# Patient Record
(demographics unavailable — no encounter records)

---

## 2025-02-04 NOTE — END OF VISIT
[] : Resident [FreeTextEntry3] : I personally saw and examined this patient with the resident physician and was present for the key portions of history taking, examination as well as any procedures performed .  I agree with the assessment and plan as documented in the resident's note unless noted below.   Asif Juarez MD Physician, Dermatology and Dermatologic Surgery [facility]

## 2025-02-04 NOTE — PHYSICAL EXAM
[Alert] : alert [Oriented x 3] : ~L oriented x 3 [Conjunctiva Non-injected] : conjunctiva non-injected [Well Nourished] : well nourished [No Visual Lymphadenopathy] : no visual  lymphadenopathy [No Clubbing] : no clubbing [No Edema] : no edema [No Bromhidrosis] : no bromhidrosis [No Chromhidrosis] : no chromhidrosis [Hair] : Hair [Scalp] : Scalp [Face] : Face [Nose] : Nose [Eyelids] : Eyelids [Ears] : Ears [Lips] : Lips [Neck] : Neck [Nodes] : Nodes [FreeTextEntry3] : healing 0.9 cm x 0.8 cm crusted biopsy scar at Right superior antihelix  neg head/neck lymphadenopathy

## 2025-02-04 NOTE — END OF VISIT
[] : Resident [FreeTextEntry3] : I personally saw and examined this patient with the resident physician and was present for the key portions of history taking, examination as well as any procedures performed .  I agree with the assessment and plan as documented in the resident's note unless noted below.   Asif Juarez MD Physician, Dermatology and Dermatologic Surgery [facility] Rhombic Flap Text: The defect edges were debeveled with a #15 scalpel blade.  Given the location of the defect and the proximity to free margins a rhombic flap was deemed most appropriate.  Using a sterile surgical marker, an appropriate rhombic flap was drawn incorporating the defect.    The area thus outlined was incised deep to adipose tissue with a #15 scalpel blade.  The skin margins were undermined to an appropriate distance in all directions utilizing iris scissors.

## 2025-02-04 NOTE — ASSESSMENT
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  I recommended Mohs surgery due to the tumor type, location, and ill-defined nature of cancer.   Mohs Appropriate Use Criteria (AUC) Score: 8  I explained that following extirpation there will be a full thickness defect of the involved area. The reconstructive options will be based on the defect size and surrounding tissue laxity of the involved area. Primary closure is only possible for smaller defects. For larger defects, local tissue rearrangement or skin grafting may be necessary. Risks following layered primary closure or local tissue rearrangement include wound dehiscence, contour irregularity, bleeding, infection, and paresthesia (nerve damage including sensory deficit or motor weakness). Risks following skin grafting include wound dehiscence, skin graft nonadherence (partial or complete), contour irregularity, bleeding, infection, paresthesia, and donor site complications. I explained that the patient will need to abstain from physical activity for 1-2 weeks following the surgery, that they would heal with a scar, and also discussed the chances of infection, bleeding, potential sensory or motor nerve damage, and recurrence.  The patient indicated that s/he understood the risks and wished to schedule the procedure. -- In particular, for reconstruction we discussed second intent healing  Thank you for this Mohs surgery referral. We recommended that Mr. LUCINDA LEON follow up with His referring dermatologist for routine skin exams following surgery.   Asif Juarez MD Physician, Dermatology & Dermatologic Surgery [facility]

## 2025-02-04 NOTE — HISTORY OF PRESENT ILLNESS
[FreeTextEntry1] : Mohs surgery consultation SCC Right superior antihelix  [de-identified] : 02/03/2025  Referred by: Dr. Rangel  Mr. LUCINDA LEON is a 68 year old M w/ history of SCC, R frontal scalp s/p Mohs in 2022 who presents for Mohs surgery consultation SCC R superior antihelix. Patient states he noticed the lesion a few months ago, never painful, never bled. He was seen by Dr Rangel, received LN2 to the lesion, after which lesion began to grow further. Biopsy obtained by Dr Rangel at followup      Pertinent positives noted below History of HIV or hepatitis: No Blood thinners: No Antibiotic Prophylaxis: None Medical implants: None   The patient's review of systems questionnaire was reviewed. Education needs were identified. There were no barriers to learning.

## 2025-02-04 NOTE — ASSESSMENT
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  I recommended Mohs surgery due to the tumor type, location, and ill-defined nature of cancer.   Mohs Appropriate Use Criteria (AUC) Score: 8  I explained that following extirpation there will be a full thickness defect of the involved area. The reconstructive options will be based on the defect size and surrounding tissue laxity of the involved area. Primary closure is only possible for smaller defects. For larger defects, local tissue rearrangement or skin grafting may be necessary. Risks following layered primary closure or local tissue rearrangement include wound dehiscence, contour irregularity, bleeding, infection, and paresthesia (nerve damage including sensory deficit or motor weakness). Risks following skin grafting include wound dehiscence, skin graft nonadherence (partial or complete), contour irregularity, bleeding, infection, paresthesia, and donor site complications. I explained that the patient will need to abstain from physical activity for 1-2 weeks following the surgery, that they would heal with a scar, and also discussed the chances of infection, bleeding, potential sensory or motor nerve damage, and recurrence.  The patient indicated that s/he understood the risks and wished to schedule the procedure. -- In particular, for reconstruction we discussed second intent healing  Thank you for this Mohs surgery referral. We recommended that Mr. LUCINDA LEON follow up with His referring dermatologist for routine skin exams following surgery.   Asif Juarez MD Physician, Dermatology & Dermatologic Surgery [facility]

## 2025-02-04 NOTE — HISTORY OF PRESENT ILLNESS
[FreeTextEntry1] : Mohs surgery consultation SCC Right superior antihelix  [de-identified] : 02/03/2025  Referred by: Dr. Rangel  Mr. LUCINDA LEON is a 68 year old M w/ history of SCC, R frontal scalp s/p Mohs in 2022 who presents for Mohs surgery consultation SCC R superior antihelix. Patient states he noticed the lesion a few months ago, never painful, never bled. He was seen by Dr Rangel, received LN2 to the lesion, after which lesion began to grow further. Biopsy obtained by Dr Rangel at followup      Pertinent positives noted below History of HIV or hepatitis: No Blood thinners: No Antibiotic Prophylaxis: None Medical implants: None   The patient's review of systems questionnaire was reviewed. Education needs were identified. There were no barriers to learning.

## 2025-02-06 NOTE — END OF VISIT
[] : Resident [FreeTextEntry3] : I personally saw and examined this patient with the fellow physician and was present for the key portions of history taking, examination as well as the Mohs procedures performed .  I agree with the assessment and plan as documented in the fellow's note unless noted below.   Asif Juarez MD Physician, Dermatology and Dermatologic Surgery [facility]

## 2025-02-06 NOTE — HISTORY OF PRESENT ILLNESS
[FreeTextEntry1] : Mohs surgery for SCC Right superior antihelix  [de-identified] : 02/05/2025  Referred by: Dr. Rangel  Mr. LUCINDA LEON is a 68 year old M w/ history of SCC, R frontal scalp s/p Mohs in 2022 who presents for Mohs surgery consultation SCC R superior antihelix. Patient states he noticed the lesion a few months ago, never painful, never bled. He was seen by Dr Rangel, received LN2 to the lesion, after which lesion began to grow further. Biopsy obtained by Dr Rangel at followup showing SCC at least in situ extending to the base and cannot r/o invasive SCC. No treatment to date.     Pertinent positives noted below History of HIV or hepatitis: No Blood thinners: No Antibiotic Prophylaxis: None Medical implants: None   The patient's review of systems questionnaire was reviewed. Education needs were identified. There were no barriers to learning.  Mohs surgery consultation for SCC Right Superior Antihelix  -- I explained the treatment options of topical immunomodulatory or chemotherapy, electrodessication and curettage, radiation therapy, excision and Mohs surgery. I recommended Mohs surgery due to the tumor type, location, and ill-defined nature of cancer.  Mohs Appropriate Use Criteria (AUC) Score: 8  I explained that following extirpation there will be a full thickness defect of the involved area. The reconstructive options will be based on the defect size and surrounding tissue laxity of the involved area. Primary closure is only possible for smaller defects. For larger defects, local tissue rearrangement or skin grafting may be necessary. Risks following layered primary closure or local tissue rearrangement include wound dehiscence, contour irregularity, bleeding, infection, and paresthesia (nerve damage including sensory deficit or motor weakness). Risks following skin grafting include wound dehiscence, skin graft nonadherence (partial or complete), contour irregularity, bleeding, infection, paresthesia, and donor site complications. I explained that the patient will need to abstain from physical activity for 1-2 weeks following the surgery, that they would heal with a scar, and also discussed the chances of infection, bleeding, potential sensory or motor nerve damage, and recurrence. The patient indicated that s/he understood the risks and wished to proceed today -- In particular, for reconstruction we discussed second intent healing  Thank you for this Mohs surgery referral. We recommended that Mr. LUCINDA LEON follow up with His referring dermatologist for routine skin exams following surgery.

## 2025-02-06 NOTE — ASSESSMENT
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   Thank you for this Mohs surgery referral.  Asif Juarez MD Physician, Dermatology & Dermatologic Surgery [facility].

## 2025-02-06 NOTE — PHYSICAL EXAM
[Alert] : alert [Oriented x 3] : ~L oriented x 3 [Well Nourished] : well nourished [Conjunctiva Non-injected] : conjunctiva non-injected [No Visual Lymphadenopathy] : no visual  lymphadenopathy [No Clubbing] : no clubbing [No Edema] : no edema [No Bromhidrosis] : no bromhidrosis [No Chromhidrosis] : no chromhidrosis [Hair] : Hair [Scalp] : Scalp [Face] : Face [Nose] : Nose [Eyelids] : Eyelids [Ears] : Ears [Lips] : Lips [Neck] : Neck [Nodes] : Nodes [FreeTextEntry3] : 1.1 cm x 1.0 cm crusted biopsy scar at Right superior antihelix  neg head/neck lymphadenopathy

## 2025-02-06 NOTE — HISTORY OF PRESENT ILLNESS
[FreeTextEntry1] : Mohs surgery for SCC Right superior antihelix  [de-identified] : 02/05/2025  Referred by: Dr. Rangel  Mr. LUCINDA LEON is a 68 year old M w/ history of SCC, R frontal scalp s/p Mohs in 2022 who presents for Mohs surgery consultation SCC R superior antihelix. Patient states he noticed the lesion a few months ago, never painful, never bled. He was seen by Dr Rangel, received LN2 to the lesion, after which lesion began to grow further. Biopsy obtained by Dr Rangel at followup showing SCC at least in situ extending to the base and cannot r/o invasive SCC. No treatment to date.     Pertinent positives noted below History of HIV or hepatitis: No Blood thinners: No Antibiotic Prophylaxis: None Medical implants: None   The patient's review of systems questionnaire was reviewed. Education needs were identified. There were no barriers to learning.  Mohs surgery consultation for SCC Right Superior Antihelix  -- I explained the treatment options of topical immunomodulatory or chemotherapy, electrodessication and curettage, radiation therapy, excision and Mohs surgery. I recommended Mohs surgery due to the tumor type, location, and ill-defined nature of cancer.  Mohs Appropriate Use Criteria (AUC) Score: 8  I explained that following extirpation there will be a full thickness defect of the involved area. The reconstructive options will be based on the defect size and surrounding tissue laxity of the involved area. Primary closure is only possible for smaller defects. For larger defects, local tissue rearrangement or skin grafting may be necessary. Risks following layered primary closure or local tissue rearrangement include wound dehiscence, contour irregularity, bleeding, infection, and paresthesia (nerve damage including sensory deficit or motor weakness). Risks following skin grafting include wound dehiscence, skin graft nonadherence (partial or complete), contour irregularity, bleeding, infection, paresthesia, and donor site complications. I explained that the patient will need to abstain from physical activity for 1-2 weeks following the surgery, that they would heal with a scar, and also discussed the chances of infection, bleeding, potential sensory or motor nerve damage, and recurrence. The patient indicated that s/he understood the risks and wished to proceed today -- In particular, for reconstruction we discussed second intent healing  Thank you for this Mohs surgery referral. We recommended that Mr. LUCINDA LEON follow up with His referring dermatologist for routine skin exams following surgery.

## 2025-02-06 NOTE — PROCEDURE
[TextEntry] : Mohs Surgery Procedure Note   A surgical time out was taken to confirm the patient's correct identity and the correct site. The operative site was identified by the patient and surgical team prior to surgery and the patient agreed to proceed with the surgical site which was marked prior to anesthesia infiltration.   Mohs Micrographic Surgery Report Date Collected: 02/05/2025 Date Received: Same as above Date Verified: Same as above Attending Surgeon: Asif Juarez MD Fellow: Marilee Wolf MD Assistants: Marty Montemayor RN, Amelia Louise MA Procedure#:  Diagnosis: SCC Location: Right Superior Antihelix Pre-op Size: 1.1 cm x 1.0 cm Post-Operative Final Defect Size: 1.5 cm x 1.1 cm Stages (number of pieces per stage): 1 (2/1) Pathology, if tumor noted in stages: Stage I with Sparse perivascular lymphocytic infiltrate without evidence of residual carcinoma on sections examined Indications for procedure: Ill-defined tumor margins, high-priority anatomic location for preservation of normal tissue, aggressive histologic nature of the tumor Repair type: N/A (Second intent) Total volume of anesthesia: 5 mL    Mohs Procedure Report:   The patient was escorted to the outpatient surgical operatory. The proposed Mohs procedure and reconstruction options were discussed with the patient. The risks, benefits, and alternatives were discussed and the patient signed a written consent form. A time out was taken to confirm the patient's identity and the exact location of the skin cancer. After the patient was placed in a recumbent position, the surgical site was cleaned with alcohol and either Betadine (for eyes and ears) or chlorhexidine gluconate, draped, and infiltrated with 0.5% lidocaine with 1:200,000 epinephrine local anesthetic. A sterile surgical marking pen was used to outline a thin margin of normal-appearing skin around the tumor. A beveled incision was then made using a scalpel. Small orienting nicks were made on the specimen and the surrounding skin. The tissue was then sharply dissected from the surrounding skin. Hemostasis was maintained with the electrosurgical unit and/or pressure. A temporary dressing was placed on the surgical defect until the frozen section slides were interpreted. The oriented specimen was placed in a Rod dish and transported to the [facility] laboratory. For each stage of the procedure, a visual representation of the specimen was drawn on a Mohs map. This map graphically depicts the specimen's two-dimensional appearance, orientation, and tissue preparation, which consists of dividing the specimen and applying tissue dyes. Because the deep and peripheral portions of the tissue are then embedded in the same geometric plane, the map also represents an oriented scale drawing of the resulting histologic sections. The Mohs technician then prepared frozen section slides using standard techniques. The slides were stained with hematoxylin and eosin, and cover slips were applied. The frozen section slides were then examined under the microscope. If tumor was found, it was localized on the map. The orienting nicks on the original specimen corresponded to similar nicks on the surgical defect so areas of identified tumor could be mapped back to the patient and resected. Additional layers of removed skin were then processed as indicated above. This iterative process continued as applicable until no tumor was observed microscopically. At this stage, the Mohs resection was complete.  Second Intention Healing After the surgical procedure was completed, the patient was brought back to the minor surgery operatory. Various reconstructive modalities were discussed with the patient. Second intention healing was selected as the best option. A pressure dressing composed of Vaseline ointment, Telfa, and sterile gauze and was securely taped into place. The patient was given complete verbal and written wound care instructions and was asked to follow up for a wound check as indicated. The patient ambulated from the minor surgery operatory without problems.

## 2025-02-06 NOTE — ASSESSMENT
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   Thank you for this Mohs surgery referral.  Asif Juarez MD Physician, Dermatology & Dermatologic Surgery [facility].

## 2025-02-06 NOTE — PROCEDURE
[TextEntry] : Mohs Surgery Procedure Note   A surgical time out was taken to confirm the patient's correct identity and the correct site. The operative site was identified by the patient and surgical team prior to surgery and the patient agreed to proceed with the surgical site which was marked prior to anesthesia infiltration.   Mohs Micrographic Surgery Report Date Collected: 02/05/2025 Date Received: Same as above Date Verified: Same as above Attending Surgeon: Asif Juarez MD Fellow: Marilee Wolf MD Assistants: Marty Montemayor RN, Amelia Louise MA Procedure#:  Diagnosis: SCC Location: Right Superior Antihelix Pre-op Size: 1.1 cm x 1.0 cm Post-Operative Final Defect Size: 1.5 cm x 1.1 cm Stages (number of pieces per stage): 1 (2/1) Pathology, if tumor noted in stages: Stage I with Sparse perivascular lymphocytic infiltrate without evidence of residual carcinoma on sections examined Indications for procedure: Ill-defined tumor margins, high-priority anatomic location for preservation of normal tissue, aggressive histologic nature of the tumor Repair type: N/A (Second intent) Total volume of anesthesia: 5 mL    Mohs Procedure Report:   The patient was escorted to the outpatient surgical operatory. The proposed Mohs procedure and reconstruction options were discussed with the patient. The risks, benefits, and alternatives were discussed and the patient signed a written consent form. A time out was taken to confirm the patient's identity and the exact location of the skin cancer. After the patient was placed in a recumbent position, the surgical site was cleaned with alcohol and either Betadine (for eyes and ears) or chlorhexidine gluconate, draped, and infiltrated with 0.5% lidocaine with 1:200,000 epinephrine local anesthetic. A sterile surgical marking pen was used to outline a thin margin of normal-appearing skin around the tumor. A beveled incision was then made using a scalpel. Small orienting nicks were made on the specimen and the surrounding skin. The tissue was then sharply dissected from the surrounding skin. Hemostasis was maintained with the electrosurgical unit and/or pressure. A temporary dressing was placed on the surgical defect until the frozen section slides were interpreted. The oriented specimen was placed in a Rod dish and transported to the [facility] laboratory. For each stage of the procedure, a visual representation of the specimen was drawn on a Mohs map. This map graphically depicts the specimen's two-dimensional appearance, orientation, and tissue preparation, which consists of dividing the specimen and applying tissue dyes. Because the deep and peripheral portions of the tissue are then embedded in the same geometric plane, the map also represents an oriented scale drawing of the resulting histologic sections. The Mohs technician then prepared frozen section slides using standard techniques. The slides were stained with hematoxylin and eosin, and cover slips were applied. The frozen section slides were then examined under the microscope. If tumor was found, it was localized on the map. The orienting nicks on the original specimen corresponded to similar nicks on the surgical defect so areas of identified tumor could be mapped back to the patient and resected. Additional layers of removed skin were then processed as indicated above. This iterative process continued as applicable until no tumor was observed microscopically. At this stage, the Mohs resection was complete.  Second Intention Healing After the surgical procedure was completed, the patient was brought back to the minor surgery operatory. Various reconstructive modalities were discussed with the patient. Second intention healing was selected as the best option. A pressure dressing composed of Vaseline ointment, Telfa, and sterile gauze and was securely taped into place. The patient was given complete verbal and written wound care instructions and was asked to follow up for a wound check as indicated. The patient ambulated from the minor surgery operatory without problems.

## 2025-02-06 NOTE — END OF VISIT
[] : Resident [FreeTextEntry3] : I personally saw and examined this patient with the fellow physician and was present for the key portions of history taking, examination as well as the Mohs procedures performed .  I agree with the assessment and plan as documented in the fellow's note unless noted below.   Asif Juarez MD Physician, Dermatology and Dermatologic Surgery [facility]

## 2025-02-13 NOTE — HISTORY OF PRESENT ILLNESS
[FreeTextEntry1] : Wound Check s/p Mohs surgery for SCC Right superior antihelix 2/5/25 [de-identified] : 02/11/2025  Mr. LUCINDA LEON is a 68 year old M presenting in follow up s/p Mohs as above. Has done well without issues and is covering with mupirocin. Here with wife today.  Patient denies fever, chills, night sweats, unintentional weight loss or other constitutional symptoms

## 2025-02-13 NOTE — ASSESSMENT
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   Thank you for this Mohs surgery referral.  Asif Juarez MD Physician, Dermatology & Dermatologic Surgery [facility].

## 2025-02-13 NOTE — PHYSICAL EXAM
[Alert] : alert [Oriented x 3] : ~L oriented x 3 [Well Nourished] : well nourished [Conjunctiva Non-injected] : conjunctiva non-injected [No Visual Lymphadenopathy] : no visual  lymphadenopathy [No Clubbing] : no clubbing [No Edema] : no edema [No Bromhidrosis] : no bromhidrosis [No Chromhidrosis] : no chromhidrosis [Hair] : Hair [Scalp] : Scalp [Face] : Face [Nose] : Nose [Eyelids] : Eyelids [Ears] : Ears [Lips] : Lips [Neck] : Neck [Nodes] : Nodes [FreeTextEntry3] : right ear with 1.3 cm wound down to cartilage, moist with some granulation at the periphery

## 2025-02-25 NOTE — ASSESSMENT
[FreeTextEntry1] : Wound Check s/p Mohs surgery for SCC Right Superior Antihelix -- 1 stage(s) of Mohs surgery performed 02/05/2025 -- Healing by second intent, not progressing as well as expected at this point so I advised I recommended debriding with a curette, perfomed today -also RX for Cipro 500 mg BID x 1 week to treat for suspected chondritis -- f/u for wound check in 1 week1 -- Mupirocin ointment bid and dressings daily -- f/u for routine skin exams as previously recommended by His referring dermatologist.   Thank you for this Mohs surgery referral.  Asif Juarez MD Physician, Dermatology & Dermatologic Surgery [facility].

## 2025-02-25 NOTE — PHYSICAL EXAM
[Alert] : alert [Oriented x 3] : ~L oriented x 3 [Well Nourished] : well nourished [Conjunctiva Non-injected] : conjunctiva non-injected [No Visual Lymphadenopathy] : no visual  lymphadenopathy [No Clubbing] : no clubbing [No Edema] : no edema [No Bromhidrosis] : no bromhidrosis [No Chromhidrosis] : no chromhidrosis [Hair] : Hair [Scalp] : Scalp [Face] : Face [Nose] : Nose [Eyelids] : Eyelids [Ears] : Ears [Lips] : Lips [Neck] : Neck [Nodes] : Nodes [FreeTextEntry3] : right ear with wound 1.0 cm down to cartilage, slight surroundng erythema and scarcity of granulation tissue

## 2025-02-25 NOTE — HISTORY OF PRESENT ILLNESS
[FreeTextEntry1] : Wound Check s/p Mohs surgery for SCC Right superior antihelix 2/5/25 [de-identified] : 02/25/2025  Mr. LUCINDA LEON is a 68 year old M presenting in follow up s/p Mohs as above. Has done well overall but does note some tenderness of his ear developing in the last week. Doing his best to keep covered but is finding the dressing has a hard time staying on.  Patient denies fever, chills, night sweats, unintentional weight loss or other constitutional symptoms

## 2025-03-03 NOTE — PHYSICAL EXAM
[Alert] : alert [Oriented x 3] : ~L oriented x 3 [Well Nourished] : well nourished [Conjunctiva Non-injected] : conjunctiva non-injected [No Visual Lymphadenopathy] : no visual  lymphadenopathy [No Clubbing] : no clubbing [No Edema] : no edema [No Bromhidrosis] : no bromhidrosis [No Chromhidrosis] : no chromhidrosis [Hair] : Hair [Scalp] : Scalp [Face] : Face [Nose] : Nose [Eyelids] : Eyelids [Ears] : Ears [Lips] : Lips [Neck] : Neck [Nodes] : Nodes [FreeTextEntry3] : right ear with wound 1.0 cm and healthy granulation tissue

## 2025-03-03 NOTE — ASSESSMENT
[FreeTextEntry1] : Wound Check s/p Mohs surgery for SCC Right Superior Antihelix, Complicated by Chondritis -- 1 stage(s) of Mohs surgery performed 02/05/2025 -- Healing by second intent, progressing well -chondritis appears resolved -- Mupirocin ointment bid and dressings daily -RTC in 2 weeks -- f/u for routine skin exams as previously recommended by His referring dermatologist.   Thank you for this Mohs surgery referral.  Reason MD Ann Physician, Dermatology & Dermatologic Surgery [facility].

## 2025-03-03 NOTE — HISTORY OF PRESENT ILLNESS
[FreeTextEntry1] : Wound Check s/p Mohs surgery for SCC Right superior antihelix 2/5/25 [de-identified] : 3/3/2025  Mr. LUCINDA LEON is a 68 year old M presenting in follow up s/p Mohs as above. Has done well overall, he had tenderness at last visit consistent with chondritis and started on Cipro at last visit. Feeling better and less painful, notes it is filling in.  Patient denies fever, chills, night sweats, unintentional weight loss or other constitutional symptoms

## 2025-03-17 NOTE — ASSESSMENT
[FreeTextEntry1] : Wound Check s/p Mohs surgery for SCC Right Superior Antihelix, Complicated by Chondritis -- 1 stage(s) of Mohs surgery performed 02/05/2025 -- Healing by second intent, fully healed -- f/u for routine skin exams as previously recommended by His referring dermatologist.   Thank you for this Mohs surgery referral.  Asif Juarez MD Physician, Dermatology & Dermatologic Surgery [facility].

## 2025-03-17 NOTE — PHYSICAL EXAM
[Alert] : alert [Oriented x 3] : ~L oriented x 3 [Well Nourished] : well nourished [Conjunctiva Non-injected] : conjunctiva non-injected [No Visual Lymphadenopathy] : no visual  lymphadenopathy [No Clubbing] : no clubbing [No Edema] : no edema [No Bromhidrosis] : no bromhidrosis [No Chromhidrosis] : no chromhidrosis [Hair] : Hair [Scalp] : Scalp [Face] : Face [Nose] : Nose [Eyelids] : Eyelids [Ears] : Ears [Lips] : Lips [Neck] : Neck [Nodes] : Nodes [FreeTextEntry3] : right ear with fully healed wound

## 2025-03-17 NOTE — HISTORY OF PRESENT ILLNESS
[FreeTextEntry1] : Wound Check s/p Mohs surgery for SCC Right superior antihelix 2/5/25 [de-identified] : 3/17/2025  Mr. LUCINDA LEON is a 68 year old M presenting in follow up s/p Mohs as above. Has done well overall, and notes the wound has filled in.  Patient denies fever, chills, night sweats, unintentional weight loss or other constitutional symptoms

## 2025-07-09 NOTE — PHYSICAL EXAM
[Normal Thyroid] : the thyroid was normal [No Rash or Lesion] : No rash or lesion [Alert] : alert [Oriented to Person] : oriented to person [Oriented to Place] : oriented to place [Oriented to Time] : oriented to time [Calm] : calm [JVD] : no jugular venous distention  [de-identified] :  No acute distress  [de-identified] :  No respiratory distress  [de-identified] :  Regular rate  [de-identified] : soft, nontender. no rebound or guarding.  [de-identified] :  normal range of motion  [de-identified] : left lower back mass 1cm

## 2025-07-09 NOTE — PLAN
[FreeTextEntry1] : Follow up in 2 weeks for suture removal.   I, Bridget Gale NP, am scribing for and the presence of Dr Orozco the following sections HISTORY OF PRESENT ILLNESS, PAST MEDICAL/FAMILY/SOCIAL HISTORY; REVIEW OF SYSTEMS; VITAL SIGNS; PHYISCAL EXAM; DISPOSITION."  I personally performed the services described in the documentation, reviewed the documentation recorded by the scribe in my presence and it accurately and completely records my words and actions.

## 2025-07-09 NOTE — ASSESSMENT
[FreeTextEntry1] :  Mr. LEON is a 69 year man with left lower back mass. We discussed the options of excision. The risks/benefits and alternatives were discussed at length and all questions were answered. Informed consent was obtained.   Region was prepped/draped. After region was anesthetized, 1.5 cm incision was made. Cystic mass circumferentially dissected from from surrounding tissue and passed off as specimen. Mass measured 1cm. Hemostasis achieved. Skin closed with 3-0 prolene sutrues. Dry sterile dressing applied.

## 2025-07-09 NOTE — HISTORY OF PRESENT ILLNESS
[de-identified] : Mr. LEON is a 69 year old man with left lower back mass, which has been present for several months, presents for evaluation. Reports intermittent pain at times. Denies fever/chills/nausea/emesis or changes in bowel or bladder habits. Tolerating diet. Normal bowel movements.         
[de-identified] : Mr. LEON is a 69 year old man with left lower back mass, which has been present for several months, presents for evaluation. Reports intermittent pain at times. Denies fever/chills/nausea/emesis or changes in bowel or bladder habits. Tolerating diet. Normal bowel movements.         
Yes

## 2025-07-09 NOTE — PHYSICAL EXAM
[Normal Thyroid] : the thyroid was normal [No Rash or Lesion] : No rash or lesion [Alert] : alert [Oriented to Person] : oriented to person [Oriented to Place] : oriented to place [Oriented to Time] : oriented to time [Calm] : calm [JVD] : no jugular venous distention  [de-identified] :  No acute distress  [de-identified] :  No respiratory distress  [de-identified] :  Regular rate  [de-identified] : soft, nontender. no rebound or guarding.  [de-identified] :  normal range of motion  [de-identified] : left lower back mass 1cm

## 2025-07-22 NOTE — RESULTS/DATA
[FreeTextEntry1] : ***SURGICAL PATHOLOGY*** 07/03/25 Specimen(s) Submitted 1  Left back mass  Final Diagnosis Left back mass, biopsy - Sebaceous carcinoma, see note Note:  The neoplasm is positive for p63 and androgen receptor, focally positive for Jr-EP4, and negative for CK7. The neoplasm is fragmented. Complete re-excision is advised.  ***US***06/12/25:  -0.9 x 0.5 x 0.5cm hypoechoic structure with increased vascularity.

## 2025-07-22 NOTE — PHYSICAL EXAM
[Normal] : oriented to person, place and time, with appropriate affect [de-identified] : RRR [de-identified] : Easy WOB [de-identified] : Left back incision well healed.

## 2025-07-22 NOTE — HISTORY OF PRESENT ILLNESS
[de-identified] : Charlie Deleon is 70 yo M presenting today for an initial consultation for left back mass that was biopsied and resulted as sebaceous carcinoma. US (06/12/25) showed a0.9 x 0.5 x 0.5cm hypoechoic structure with increased vascularity.  He says this lesion popped up about 3 months ago. His PCP and derm thought it was a cyst. It was not changing in size. Eventually underwent surgical resection which showed sebaceous carcinoma. Pre-procedure ultrasound showing a 1cm lesion.  PMHx: HTN, SCC, *** history of colon polyps- Tubular adenoma***  PSHx: Cataracts, Right Surgery, Tonsillectomy  FHx: Social: Non-smoker,

## 2025-07-22 NOTE — ASSESSMENT
[FreeTextEntry1] : Mr. Cruz is a 68 y/o M with a left lower back mass that was biopsied and resulted as sebaceous carcinoma. Preoperative US (06/12/25) showed a0.9 x 0.5 x 0.5cm hypoechoic structure with increased vascularity.  We reviewed his PMHx, Surgical Hx, imaging and biopsy results in detail today. We discussed that sebaceous carcinoma can be locally aggressive and that complete surgical excision is recommended. Given this, we will re-excise the area with 1cm margins under local anesthesia as it is amenable to an office excision.  Plan: 1) will plan for re-excision of left lower back sebaceous carcinoma in office 2) Booking placed.  Venu Haynes MD Assistant Professor of Surgery [place] [facility] [facility] [employer] at [facility] Division of Surgical Oncology [facility] [place] [facility] Phone: [phone] Fax: [phone]   Today, I personally spent  61 minutes in total time including reviewing imaging and studies, discussing complex treatment regimens, direct face to face time with the patient, patient education and counseling.

## 2025-07-22 NOTE — HISTORY OF PRESENT ILLNESS
[de-identified] : Charlie Deleon is 68 yo M presenting today for an initial consultation for left back mass that was biopsied and resulted as sebaceous carcinoma. US (06/12/25) showed a0.9 x 0.5 x 0.5cm hypoechoic structure with increased vascularity.  He says this lesion popped up about 3 months ago. His PCP and derm thought it was a cyst. It was not changing in size. Eventually underwent surgical resection which showed sebaceous carcinoma. Pre-procedure ultrasound showing a 1cm lesion.  PMHx: HTN, SCC, *** history of colon polyps- Tubular adenoma***  PSHx: Cataracts, Right Surgery, Tonsillectomy  FHx: Social: Non-smoker,

## 2025-07-22 NOTE — ASSESSMENT
[FreeTextEntry1] : Mr. Cruz is a 70 y/o M with a left lower back mass that was biopsied and resulted as sebaceous carcinoma. Preoperative US (06/12/25) showed a0.9 x 0.5 x 0.5cm hypoechoic structure with increased vascularity.  We reviewed his PMHx, Surgical Hx, imaging and biopsy results in detail today. We discussed that sebaceous carcinoma can be locally aggressive and that complete surgical excision is recommended. Given this, we will re-excise the area with 1cm margins under local anesthesia as it is amenable to an office excision.  Plan: 1) will plan for re-excision of left lower back sebaceous carcinoma in office 2) Booking placed.  Venu Haynes MD Assistant Professor of Surgery [place] [facility] [employer] [facility] [employer] [facility] at [facility] Division of Surgical Oncology [facility] [place] [facility] Phone: [phone] Fax: [phone]   Today, I personally spent  61 minutes in total time including reviewing imaging and studies, discussing complex treatment regimens, direct face to face time with the patient, patient education and counseling.

## 2025-07-22 NOTE — PHYSICAL EXAM
[Normal] : oriented to person, place and time, with appropriate affect [de-identified] : RRR [de-identified] : Easy WOB [de-identified] : Left back incision well healed.

## 2025-07-22 NOTE — RESULTS/DATA
[FreeTextEntry1] : ***SURGICAL PATHOLOGY*** 07/03/25 Specimen(s) Submitted 1  Left back mass  Final Diagnosis Left back mass, biopsy - Sebaceous carcinoma, see note Note:  The neoplasm is positive for p63 and androgen receptor, focally positive for Jr-EP4, and negative for CK7. The neoplasm is fragmented. Complete re-excision is advised.  ***US***06/12/25:  -0.9 x 0.5 x 0.5cm hypoechoic structure with increased vascularity.    doing "well " in pst/depression/anxiety

## 2025-07-22 NOTE — REASON FOR VISIT
[Initial Consultation] : an initial consultation for [Referred By: ___] : Referred By: [unfilled] [Spouse] : spouse [FreeTextEntry2] : sebaceous carcinoma

## 2025-07-23 NOTE — HISTORY OF PRESENT ILLNESS
[de-identified] : Mr. LEON is a 69 year old man status post excision of back mass who comes in today for followup visit. He is doing well. Denies pain. Denies fever/chills. No redness or pain at incision site. Tolerating diet. Normal bowel movements.  Pathology reviewed - discussed findings of sebaceous carcinoma. I recommend close followup with Dr Haynes, surgical oncology - referral provided

## 2025-07-23 NOTE — ASSESSMENT
[FreeTextEntry1] : Mr. LEON is a 69 year old man status post excision of back mass  Pathology reviewed - discussed findings of sebaceous carcinoma. I recommend close followup with Dr Haynes, surgical oncology for evaluation for wide local excision- referral provided

## 2025-07-23 NOTE — PHYSICAL EXAM
[JVD] : no jugular venous distention  [Alert] : alert [Oriented to Person] : oriented to person [Oriented to Place] : oriented to place [Oriented to Time] : oriented to time [Calm] : calm [de-identified] : No acute distress  [de-identified] : No respiratory distress  [de-identified] : Regular rate  [de-identified] : soft, nontender. no rebound or guarding.  [de-identified] : normal range of motion  [de-identified] : back incision c/d/i. sutures removed

## 2025-07-25 NOTE — PROCEDURE
[Excision Of Lesion] : excision of lesion [Patient] : patient [Risks] : risks [Benefits] : benefits [Alternatives] : alternatives [Infection] : infection risk [Bleeding] : bleeding risk [___ ml Inj] : [unfilled] ~Uml [1%] : 1% [With Epi] : with epinephrine [Hibiclens] : using Hibiclens [Excisional: Margin ___mm] : the lesion was excised with a  [unfilled] ~Umm margin in an elliptical fashion [Simple Sutures] : simple sutures were used for the skin closure [Vicryl] : Vicryl suture(s) [___ # of Sutures] : [unfilled] [Running] : running [Sent to Pathology] : the excised lesion was place in buffered formalin and sent for pathology [Tolerated Well] : the patient tolerated the procedure well [No Complications] : there were no complications [___ Week(s)] : in [unfilled] week(s) [de-identified] : Sebaceous carcinoma [FreeTextEntry7] : Radical excision of left lower back sebaceous carcinoma including the underlying muscle fascia, final defect measuring 10cm in length

## 2025-07-26 NOTE — PROCEDURE
[FreeTextEntry1] : left back defect following radical resection of sebaceous carcinoma [FreeTextEntry2] : complex closure of left lower back [FreeTextEntry3] : 1% xylocaine with epinephrine [FreeTextEntry4] : <1ml [FreeTextEntry6] : Indications: This is a 69 year old male who presents with biopsy proven sebaceous carcinoma of left lower back. After radical resection of the left lower back sebaceous carcinoma including the underlying muscle fascia was performed by Dr. Haynes, intraoperative plastic surgery consult was requested to resurface the defect. On exam, there is a 10 x 5cm defect of the left lower back involving the skin, subcutaneous, and fascial layer. The surgical plan is to perform complex closure of the left back defect.   Procedure Details: After radical resection of the left lower back sebaceous carcinoma including the underlying muscle fascia was performed by Dr. Haynes (please refer to his procedure note for details), attention was focused on the left lower back defect. It is transversely oriented, measuring 10 x 5cm.  In order to facilitate closure and to minimize scarring, extensive undermining (greater than maximum width of the defect) was performed, and the wound edges were mobilized and advanced. It was reapproximated in meticulous complex fashion, the superficial fascial layer with 3-0 Monocryl retention sutures, the deep dermal layer with 3-0 Monocryl, and then the skin with 4-0 Monocryl running subcuticular sutures. Steri-strips, sterile gauze, and Tegaderm dressing were applied.   The patient tolerated the procedure well and was discharged home in satisfactory condition.

## 2025-07-26 NOTE — REASON FOR VISIT
[Procedure: _________] : a [unfilled] procedure visit [FreeTextEntry1] : Intraoperative consult for resurfacing of left back defect following radical resection of sebaceous carcinoma; complex closure of left lower back